# Patient Record
Sex: FEMALE | Race: WHITE | URBAN - METROPOLITAN AREA
[De-identification: names, ages, dates, MRNs, and addresses within clinical notes are randomized per-mention and may not be internally consistent; named-entity substitution may affect disease eponyms.]

---

## 2018-02-26 ENCOUNTER — APPOINTMENT (OUTPATIENT)
Dept: GENERAL RADIOLOGY | Facility: CLINIC | Age: 38
End: 2018-02-26
Attending: EMERGENCY MEDICINE
Payer: COMMERCIAL

## 2018-02-26 ENCOUNTER — APPOINTMENT (OUTPATIENT)
Dept: ULTRASOUND IMAGING | Facility: CLINIC | Age: 38
End: 2018-02-26
Attending: EMERGENCY MEDICINE
Payer: COMMERCIAL

## 2018-02-26 ENCOUNTER — APPOINTMENT (OUTPATIENT)
Dept: CT IMAGING | Facility: CLINIC | Age: 38
End: 2018-02-26
Attending: EMERGENCY MEDICINE
Payer: COMMERCIAL

## 2018-02-26 ENCOUNTER — HOSPITAL ENCOUNTER (EMERGENCY)
Facility: CLINIC | Age: 38
Discharge: HOME | End: 2018-02-26
Attending: EMERGENCY MEDICINE | Admitting: EMERGENCY MEDICINE
Payer: COMMERCIAL

## 2018-02-26 VITALS
OXYGEN SATURATION: 95 % | RESPIRATION RATE: 16 BRPM | DIASTOLIC BLOOD PRESSURE: 77 MMHG | SYSTOLIC BLOOD PRESSURE: 108 MMHG | TEMPERATURE: 95.7 F | HEART RATE: 65 BPM

## 2018-02-26 DIAGNOSIS — T78.40XA ALLERGIC REACTION, INITIAL ENCOUNTER: ICD-10-CM

## 2018-02-26 LAB
ANION GAP SERPL CALCULATED.3IONS-SCNC: 6 MMOL/L (ref 3–14)
APTT PPP: 29 SEC (ref 22–37)
BASOPHILS # BLD AUTO: 0 10E9/L (ref 0–0.2)
BASOPHILS NFR BLD AUTO: 0.7 %
BUN SERPL-MCNC: 13 MG/DL (ref 7–30)
CALCIUM SERPL-MCNC: 8.6 MG/DL (ref 8.5–10.1)
CHLORIDE SERPL-SCNC: 112 MMOL/L (ref 94–109)
CO2 SERPL-SCNC: 25 MMOL/L (ref 20–32)
CREAT SERPL-MCNC: 0.7 MG/DL (ref 0.52–1.04)
CRP SERPL-MCNC: <2.9 MG/L (ref 0–8)
D DIMER PPP FEU-MCNC: 0.3 UG/ML FEU (ref 0–0.5)
DIFFERENTIAL METHOD BLD: NORMAL
EOSINOPHIL # BLD AUTO: 0.2 10E9/L (ref 0–0.7)
EOSINOPHIL NFR BLD AUTO: 3 %
ERYTHROCYTE [DISTWIDTH] IN BLOOD BY AUTOMATED COUNT: 12.6 % (ref 10–15)
ERYTHROCYTE [SEDIMENTATION RATE] IN BLOOD BY WESTERGREN METHOD: 9 MM/H (ref 0–20)
GFR SERPL CREATININE-BSD FRML MDRD: >90 ML/MIN/1.7M2
GLUCOSE SERPL-MCNC: 98 MG/DL (ref 70–99)
HCG UR QL: NEGATIVE
HCT VFR BLD AUTO: 39.6 % (ref 35–47)
HGB BLD-MCNC: 12.8 G/DL (ref 11.7–15.7)
IMM GRANULOCYTES # BLD: 0 10E9/L (ref 0–0.4)
IMM GRANULOCYTES NFR BLD: 0.2 %
INR PPP: 1.04 (ref 0.86–1.14)
INTERNAL QC OK POCT: YES
LYMPHOCYTES # BLD AUTO: 2.1 10E9/L (ref 0.8–5.3)
LYMPHOCYTES NFR BLD AUTO: 36.8 %
MCH RBC QN AUTO: 29.8 PG (ref 26.5–33)
MCHC RBC AUTO-ENTMCNC: 32.3 G/DL (ref 31.5–36.5)
MCV RBC AUTO: 92 FL (ref 78–100)
MONOCYTES # BLD AUTO: 0.4 10E9/L (ref 0–1.3)
MONOCYTES NFR BLD AUTO: 6.4 %
NEUTROPHILS # BLD AUTO: 3 10E9/L (ref 1.6–8.3)
NEUTROPHILS NFR BLD AUTO: 52.9 %
NRBC # BLD AUTO: 0 10*3/UL
NRBC BLD AUTO-RTO: 0 /100
NT-PROBNP SERPL-MCNC: 33 PG/ML (ref 0–450)
PLATELET # BLD AUTO: 268 10E9/L (ref 150–450)
POTASSIUM SERPL-SCNC: 4.3 MMOL/L (ref 3.4–5.3)
RADIOLOGIST FLAGS: ABNORMAL
RBC # BLD AUTO: 4.29 10E12/L (ref 3.8–5.2)
SODIUM SERPL-SCNC: 143 MMOL/L (ref 133–144)
TROPONIN I SERPL-MCNC: <0.015 UG/L (ref 0–0.04)
WBC # BLD AUTO: 5.7 10E9/L (ref 4–11)

## 2018-02-26 PROCEDURE — 93970 EXTREMITY STUDY: CPT

## 2018-02-26 PROCEDURE — 86140 C-REACTIVE PROTEIN: CPT | Performed by: EMERGENCY MEDICINE

## 2018-02-26 PROCEDURE — 85730 THROMBOPLASTIN TIME PARTIAL: CPT | Performed by: EMERGENCY MEDICINE

## 2018-02-26 PROCEDURE — 84484 ASSAY OF TROPONIN QUANT: CPT | Performed by: EMERGENCY MEDICINE

## 2018-02-26 PROCEDURE — 85610 PROTHROMBIN TIME: CPT | Performed by: EMERGENCY MEDICINE

## 2018-02-26 PROCEDURE — 93010 ELECTROCARDIOGRAM REPORT: CPT | Mod: Z6 | Performed by: EMERGENCY MEDICINE

## 2018-02-26 PROCEDURE — 81025 URINE PREGNANCY TEST: CPT | Performed by: EMERGENCY MEDICINE

## 2018-02-26 PROCEDURE — 80048 BASIC METABOLIC PNL TOTAL CA: CPT | Performed by: EMERGENCY MEDICINE

## 2018-02-26 PROCEDURE — 93005 ELECTROCARDIOGRAM TRACING: CPT | Performed by: EMERGENCY MEDICINE

## 2018-02-26 PROCEDURE — 71260 CT THORAX DX C+: CPT

## 2018-02-26 PROCEDURE — 83880 ASSAY OF NATRIURETIC PEPTIDE: CPT | Performed by: EMERGENCY MEDICINE

## 2018-02-26 PROCEDURE — 25000125 ZZHC RX 250: Performed by: EMERGENCY MEDICINE

## 2018-02-26 PROCEDURE — 94640 AIRWAY INHALATION TREATMENT: CPT | Performed by: EMERGENCY MEDICINE

## 2018-02-26 PROCEDURE — 85652 RBC SED RATE AUTOMATED: CPT | Performed by: EMERGENCY MEDICINE

## 2018-02-26 PROCEDURE — 99285 EMERGENCY DEPT VISIT HI MDM: CPT | Mod: 25 | Performed by: EMERGENCY MEDICINE

## 2018-02-26 PROCEDURE — 25000128 H RX IP 250 OP 636: Performed by: EMERGENCY MEDICINE

## 2018-02-26 PROCEDURE — 85025 COMPLETE CBC W/AUTO DIFF WBC: CPT | Performed by: EMERGENCY MEDICINE

## 2018-02-26 PROCEDURE — 85379 FIBRIN DEGRADATION QUANT: CPT | Performed by: EMERGENCY MEDICINE

## 2018-02-26 PROCEDURE — 71046 X-RAY EXAM CHEST 2 VIEWS: CPT

## 2018-02-26 RX ORDER — PREDNISONE 10 MG/1
TABLET ORAL
Qty: 22 TABLET | Refills: 0 | Status: SHIPPED | OUTPATIENT
Start: 2018-02-26

## 2018-02-26 RX ORDER — IOPAMIDOL 755 MG/ML
100 INJECTION, SOLUTION INTRAVASCULAR ONCE
Status: COMPLETED | OUTPATIENT
Start: 2018-02-26 | End: 2018-02-26

## 2018-02-26 RX ORDER — PREDNISONE 20 MG/1
40 TABLET ORAL ONCE
Status: COMPLETED | OUTPATIENT
Start: 2018-02-26 | End: 2018-02-26

## 2018-02-26 RX ORDER — LEVALBUTEROL INHALATION SOLUTION 1.25 MG/3ML
1.25 SOLUTION RESPIRATORY (INHALATION) ONCE
Status: COMPLETED | OUTPATIENT
Start: 2018-02-26 | End: 2018-02-26

## 2018-02-26 RX ORDER — ALBUTEROL SULFATE 90 UG/1
2 AEROSOL, METERED RESPIRATORY (INHALATION) EVERY 6 HOURS PRN
Qty: 1 INHALER | Refills: 0 | Status: SHIPPED | OUTPATIENT
Start: 2018-02-26 | End: 2018-03-05

## 2018-02-26 RX ADMIN — IOPAMIDOL 50 ML: 755 INJECTION, SOLUTION INTRAVENOUS at 16:15

## 2018-02-26 RX ADMIN — LEVALBUTEROL HYDROCHLORIDE 1.25 MG: 1.25 SOLUTION RESPIRATORY (INHALATION) at 12:56

## 2018-02-26 RX ADMIN — SODIUM CHLORIDE 50 ML: 9 INJECTION, SOLUTION INTRAVENOUS at 16:16

## 2018-02-26 RX ADMIN — PREDNISONE 40 MG: 20 TABLET ORAL at 15:43

## 2018-02-26 ASSESSMENT — ENCOUNTER SYMPTOMS
RHINORRHEA: 1
GASTROINTESTINAL NEGATIVE: 1
WHEEZING: 1
FATIGUE: 1
COUGH: 1
FEVER: 0
CHEST TIGHTNESS: 1
SHORTNESS OF BREATH: 1
APPETITE CHANGE: 0
CHILLS: 0

## 2018-02-26 NOTE — ED AVS SNAPSHOT
[facility], Emergency Department    [address]    [place] [address]    Phone:  [phone]    Fax:  [phone]                                       Letty Simon   MRN: [id number]    Department:  [facility], Emergency Department   Date of Visit:  2/26/2018           Patient Information     Date Of Birth          1980        Your diagnoses for this visit were:     Allergic reaction, initial encounter with bronchospasm       You were seen by Sandra Cruz MD.        Discharge Instructions       Stop taking hormone birth control.     Take prednisone for 7 days.  Begin your at home dose tomorrow morning.     Use the albuterol inhaler every 6 hours until feeling better.      Please return if worsening/concerns.  (increasing shortness of breath, calf pain, leg swelling.      Please follow up with your primary care doctor this week for recheck.     A pulmonary embolism was seen on CT that appears to be an incidental finding today.  We discussed starting blood thinner shots today, but this was declined after discussion.    Please make an appointment to follow up with [facility] (phone: [phone]) within 1 week.  Call them tomorrow morning and schedule.     Today's results:  Results for orders placed or performed during the hospital encounter of 02/26/18 (from the past 24 hour(s))   CBC with platelets differential   Result Value Ref Range    WBC 5.7 4.0 - 11.0 10e9/L    RBC Count 4.29 3.8 - 5.2 10e12/L    Hemoglobin 12.8 11.7 - 15.7 g/dL    Hematocrit 39.6 35.0 - 47.0 %    MCV 92 78 - 100 fl    MCH 29.8 26.5 - 33.0 pg    MCHC 32.3 31.5 - 36.5 g/dL    RDW 12.6 10.0 - 15.0 %    Platelet Count 268 150 - 450 10e9/L    Diff Method Automated Method     % Neutrophils 52.9 %    % Lymphocytes 36.8 %    % Monocytes 6.4 %    % Eosinophils 3.0 %    % Basophils 0.7 %    % Immature Granulocytes 0.2 %    Nucleated RBCs 0 0 /100    Absolute Neutrophil 3.0 1.6 - 8.3 10e9/L    Absolute Lymphocytes 2.1 0.8  - 5.3 10e9/L    Absolute Monocytes 0.4 0.0 - 1.3 10e9/L    Absolute Eosinophils 0.2 0.0 - 0.7 10e9/L    Absolute Basophils 0.0 0.0 - 0.2 10e9/L    Abs Immature Granulocytes 0.0 0 - 0.4 10e9/L    Absolute Nucleated RBC 0.0    D dimer quantitative   Result Value Ref Range    D Dimer 0.3 0.0 - 0.50 ug/ml FEU   Basic metabolic panel   Result Value Ref Range    Sodium 143 133 - 144 mmol/L    Potassium 4.3 3.4 - 5.3 mmol/L    Chloride 112 (H) 94 - 109 mmol/L    Carbon Dioxide 25 20 - 32 mmol/L    Anion Gap 6 3 - 14 mmol/L    Glucose 98 70 - 99 mg/dL    Urea Nitrogen 13 7 - 30 mg/dL    Creatinine 0.70 0.52 - 1.04 mg/dL    GFR Estimate >90 >60 mL/min/1.7m2    GFR Estimate If Black >90 >60 mL/min/1.7m2    Calcium 8.6 8.5 - 10.1 mg/dL   Troponin I   Result Value Ref Range    Troponin I ES <0.015 0.000 - 0.045 ug/L   Nt probnp inpatient (BNP)   Result Value Ref Range    N-Terminal Pro BNP Inpatient 33 0 - 450 pg/mL   INR   Result Value Ref Range    INR 1.04 0.86 - 1.14   Partial thromboplastin time   Result Value Ref Range    PTT 29 22 - 37 sec   CRP inflammation   Result Value Ref Range    CRP Inflammation <2.9 0.0 - 8.0 mg/L   Erythrocyte sedimentation rate auto   Result Value Ref Range    Sed Rate 9 0 - 20 mm/h   hCG qual urine POCT   Result Value Ref Range    HCG Qual Urine Negative neg    Internal QC OK Yes    XR Chest 2 Views    Narrative    CHEST TWO VIEWS February 26, 2018 1:55 PM    HISTORY: Shortness of breath.    COMPARISON: None.      Impression    IMPRESSION: Hyperinflation. Otherwise negative.     TEZ WOODS MD   CT Chest Pulmonary Embolism w Contrast   Result Value Ref Range    Radiologist flags Pulmonary embolism (AA)     Narrative    CT CHEST AND PULMONARY EMBOLISM ANGIOGRAM  2/26/2018 4:17 PM     HISTORY: Dyspnea.     COMPARISON: None.    TECHNIQUE: Volumetric helical acquisition of CT images of the chest  from the lung apices to the kidneys were acquired after the  administration of Isovue 370, 50 mL  IV  contrast. Radiation dose for  this scan was reduced using automated exposure control, adjustment of  the mA and/or kV according to patient size, or iterative  reconstruction technique.    FINDINGS: There is a single perihilar right pulmonary embolism. This  is best seen on coronal image 47. Given its somewhat eccentric  position, this could be chronic, but an acute thrombus is not  excluded. Mucous plugging is seen in both lower lobes, overall this is  mild. Question some mild bronchial wall thickening as well. The heart  is not enlarged. Thoracic aorta is atherosclerotic without evidence of  dissection or aneurysm. There is no pleural or pericardial effusion.   There is no pneumothorax. Adrenal glands are normal.  Remainder of the  visualized upper abdomen is unremarkable.      Impression    IMPRESSION:   1. A single perihilar thrombus is seen in the lateral aspect of a  right lower lobe pulmonary artery. Its peripheral position within the  pulmonary artery could indicate that it is chronic, but an acute  thrombus cannot be excluded.  2. No thoracic aortic dissection or aneurysm.   3. Mild bronchial mucous plugging in both lower lobes.    [Critical Result: Pulmonary embolism]    Finding was identified on 2/26/2018 4:22 PM.      _______ was contacted by me at 2/26/2018 4:30 PM and verbalized  understanding of the critical finding.    US Lower Extremity Venous Duplex Bilateral    Narrative    VENOUS ULTRASOUND 2/26/2018  5:13 PM     HISTORY: Possible pulmonary embolism on CT, evaluate for deep vein  thrombosis/source. Shortness of breath.     COMPARISON: None.    FINDINGS:  Examination of the deep veins with graded compression and  color flow Doppler with spectral wave form analysis shows no evidence  of thrombus in the common femoral vein, femoral vein, popliteal vein  or calf veins.  There is no venous insufficiency.      Impression    IMPRESSION: No evidence of deep venous thrombosis.    VALERIO GALICIA MD          24 Hour Appointment Hotline       To make an appointment at any [facility], call [phone] ([phone]). If you don't have a family doctor or clinic, we will help you find one. [facility] clinics are conveniently located to serve the needs of you and your family.             Review of your medicines      Notice     You have not been prescribed any medications.            Procedures and tests performed during your visit     Basic metabolic panel    CBC with platelets differential    CRP inflammation    CT Chest Pulmonary Embolism w Contrast    D dimer quantitative    EKG 12 lead    Erythrocyte sedimentation rate auto    INR    Nt probnp inpatient (BNP)    Partial thromboplastin time    Troponin I    US Lower Extremity Venous Duplex Bilateral    XR Chest 2 Views    hCG qual urine POCT      Orders Needing Specimen Collection     None      Pending Results     Date and Time Order Name Status Description    2/26/2018 1434 CT Chest Pulmonary Embolism w Contrast Preliminary             Pending Culture Results     No orders found from 2/24/2018 to 2/27/2018.            Pending Results Instructions     If you had any lab results that were not finalized at the time of your Discharge, you can call the ED Lab Result RN at [phone]. You will be contacted by this team for any positive Lab results or changes in treatment. The nurses are available 7 days a week from 10A to 6:30P.  You can leave a message 24 hours per day and they will return your call.        Thank you for choosing [facility]       Thank you for choosing [facility] for your care. Our goal is always to provide you with excellent care. Hearing back from our patients is one way we can continue to improve our services. Please take a few minutes to complete the written survey that you may receive in the mail after you visit with us. Thank you!        Medical Referral Sourcehart Information     "[employer] lets you send messages to your doctor, view your test results, renew  "your prescriptions, schedule appointments and more. To sign up, go to [email or web address] . Click on \"Log in\" on the left side of the screen, which will take you to the Welcome page. Then click on \"Sign up Now\" on the right side of the page.     You will be asked to enter the access code listed below, as well as some personal information. Please follow the directions to create your username and password.     Your access code is: [email or web address]  Expires: 2018  6:59 PM     Your access code will  in 90 days. If you need help or a new code, please call your [facility] clinic or [phone].        Care EveryWhere ID     This is your Care EveryWhere ID. This could be used by other organizations to access your [facility] medical records  [id number]        Equal Access to Services     AMPARO FRANCE : Angela Anne, darlene saeed, chung solorzano, shari rosas . So [facility] [phone].    ATENCIÓN: Si habla español, tiene a fletcher disposición servicios gratuitos de asistencia lingüística. Llame al [phone].    We comply with applicable federal civil rights laws and [place] laws. We do not discriminate on the basis of race, color, national origin, age, disability, sex, sexual orientation, or gender identity.            After Visit Summary       This is your record. Keep this with you and show to your community pharmacist(s) and doctor(s) at your next visit.                  "

## 2018-02-26 NOTE — ED NOTES
"Pr reported after being around dogs 4 days ago, \" I start sneezing, watery eye, no cough, skin irritation, than I start feeling SOB and wheezing\"  "

## 2018-02-26 NOTE — ED AVS SNAPSHOT
[facility], Emergency Department    [address]    [facility] [address]    Phone:  [phone]    Fax:  [phone]                                       Letty Simon   MRN: [id number]    Department:  [facility], Emergency Department   Date of Visit:  2/26/2018           After Visit Summary Signature Page     I have received my discharge instructions, and my questions have been answered. I have discussed any challenges I see with this plan with the nurse or doctor.    ..........................................................................................................................................  Patient/Patient Representative Signature      ..........................................................................................................................................  Patient Representative Print Name and Relationship to Patient    ..................................................               ................................................  Date                                            Time    ..........................................................................................................................................  Reviewed by Signature/Title    ...................................................              ..............................................  Date                                                            Time

## 2018-02-26 NOTE — ED PROVIDER NOTES
History     Chief Complaint   Patient presents with     Shortness of Breath     started 3-4 days ago, pt reported wheezing, chest tideness, no hx of asthma.     HPI  Letty Simon is a 37 year old female who presents to the Emergency Department for evaluation of shortness of breath.  Patient reports that she had an allergic reaction to a dog 4 days ago including skin rash, sneezing, watery eyes.  She has never had problem with dogs in the past.  For the past 2 days she has had increased shortness of breath and fatigue with daily activities.  This is better when she is sitting and worse when she is lying down or with movement.  She has also had chest tightness and wheezing.  She has no history of asthma.  Patient is typically physically active and exercises regularly.  She also has some nasal congestion and a runny nose.  Patient denies pregnancy.  She is taking oral birth control.  She has had no fevers.      She takes ocp and has done so on and off for 10 years.  She has been on ocp continually for the past 3 years.     History reviewed. No pertinent past medical history.    Past Surgical History:   Procedure Laterality Date     ENT SURGERY         No family history on file.    Social History   Substance Use Topics     Smoking status: Never Smoker     Smokeless tobacco: Never Used     Alcohol use 3.0 oz/week     5 Glasses of wine per week       No current facility-administered medications for this encounter.      No current outpatient prescriptions on file.      Not on File    I have reviewed the Medications, Allergies, Past Medical and Surgical History, and Social History in the Epic system.    Review of Systems   Constitutional: Positive for fatigue. Negative for appetite change, chills and fever.   HENT: Positive for congestion and rhinorrhea.    Respiratory: Positive for cough (occasional ), chest tightness, shortness of breath and wheezing.    Cardiovascular: Negative for leg swelling.   Gastrointestinal:  Negative.    All other systems reviewed and are negative.      Physical Exam   BP: 130/81  Pulse: 121  Heart Rate: 60  Temp: 95.9  F (35.5  C) (pt reported drinking water couple of minnutes ago)  Resp: 18  SpO2: 94 %      Physical Exam   Constitutional: She is oriented to person, place, and time. She appears well-developed and well-nourished. No distress.   Appears sob with talking.  Thin female    HENT:   Head: Normocephalic and atraumatic.   Right Ear: External ear normal.   Left Ear: External ear normal.   Nose: Nose normal.   Mouth/Throat: Oropharynx is clear and moist.   Eyes: EOM are normal. Pupils are equal, round, and reactive to light.   Neck: Normal range of motion. Neck supple.   Cardiovascular: Normal rate, regular rhythm, normal heart sounds and intact distal pulses.    Pulmonary/Chest: Effort normal and breath sounds normal. Wheezes: ? mild wheezing.  good air movement.    Abdominal: Soft. Bowel sounds are normal.   Musculoskeletal: Normal range of motion. She exhibits no edema or tenderness.   Neurological: She is alert and oriented to person, place, and time.   Skin: Skin is warm and dry. She is not diaphoretic.   Psychiatric: She has a normal mood and affect. Her behavior is normal. Judgment and thought content normal.   Nursing note and vitals reviewed.      ED Course   12:17 PM  The patient was seen and examined by Sandra Cruz MD in Room 18.     ED Course     Procedures             EKG Interpretation:      Interpreted by Sandra Cruz  Time reviewed: 1148  Symptoms at time of EKG: shortness of breath  Rhythm: normal sinus   Rate: normal  Axis: normal  Ectopy: none  Conduction: normal  ST Segments/ T Waves: No ST-T wave changes  Q Waves: none  Comparison to prior: No old EKG available    Clinical Impression: normal EKG          Criti     Labs Ordered and Resulted from Time of ED Arrival Up to the Time of Departure from the ED   BASIC METABOLIC PANEL - Abnormal; Notable for the following:        Result  Value    Chloride 112 (*)     All other components within normal limits   HCG QUAL URINE POCT - Normal   CBC WITH PLATELETS DIFFERENTIAL   D DIMER QUANTITATIVE   TROPONIN I   NT PROBNP INPATIENT   INR   PARTIAL THROMBOPLASTIN TIME   CRP INFLAMMATION   ERYTHROCYTE SEDIMENTATION RATE AUTO     Results for orders placed or performed during the hospital encounter of 02/26/18 (from the past 24 hour(s))   CBC with platelets differential   Result Value Ref Range    WBC 5.7 4.0 - 11.0 10e9/L    RBC Count 4.29 3.8 - 5.2 10e12/L    Hemoglobin 12.8 11.7 - 15.7 g/dL    Hematocrit 39.6 35.0 - 47.0 %    MCV 92 78 - 100 fl    MCH 29.8 26.5 - 33.0 pg    MCHC 32.3 31.5 - 36.5 g/dL    RDW 12.6 10.0 - 15.0 %    Platelet Count 268 150 - 450 10e9/L    Diff Method Automated Method     % Neutrophils 52.9 %    % Lymphocytes 36.8 %    % Monocytes 6.4 %    % Eosinophils 3.0 %    % Basophils 0.7 %    % Immature Granulocytes 0.2 %    Nucleated RBCs 0 0 /100    Absolute Neutrophil 3.0 1.6 - 8.3 10e9/L    Absolute Lymphocytes 2.1 0.8 - 5.3 10e9/L    Absolute Monocytes 0.4 0.0 - 1.3 10e9/L    Absolute Eosinophils 0.2 0.0 - 0.7 10e9/L    Absolute Basophils 0.0 0.0 - 0.2 10e9/L    Abs Immature Granulocytes 0.0 0 - 0.4 10e9/L    Absolute Nucleated RBC 0.0    D dimer quantitative   Result Value Ref Range    D Dimer 0.3 0.0 - 0.50 ug/ml FEU   Basic metabolic panel   Result Value Ref Range    Sodium 143 133 - 144 mmol/L    Potassium 4.3 3.4 - 5.3 mmol/L    Chloride 112 (H) 94 - 109 mmol/L    Carbon Dioxide 25 20 - 32 mmol/L    Anion Gap 6 3 - 14 mmol/L    Glucose 98 70 - 99 mg/dL    Urea Nitrogen 13 7 - 30 mg/dL    Creatinine 0.70 0.52 - 1.04 mg/dL    GFR Estimate >90 >60 mL/min/1.7m2    GFR Estimate If Black >90 >60 mL/min/1.7m2    Calcium 8.6 8.5 - 10.1 mg/dL   Troponin I   Result Value Ref Range    Troponin I ES <0.015 0.000 - 0.045 ug/L   Nt probnp inpatient (BNP)   Result Value Ref Range    N-Terminal Pro BNP Inpatient 33 0 - 450 pg/mL   INR    Result Value Ref Range    INR 1.04 0.86 - 1.14   Partial thromboplastin time   Result Value Ref Range    PTT 29 22 - 37 sec   CRP inflammation   Result Value Ref Range    CRP Inflammation <2.9 0.0 - 8.0 mg/L   Erythrocyte sedimentation rate auto   Result Value Ref Range    Sed Rate 9 0 - 20 mm/h   hCG qual urine POCT   Result Value Ref Range    HCG Qual Urine Negative neg    Internal QC OK Yes    XR Chest 2 Views    Narrative    CHEST TWO VIEWS February 26, 2018 1:55 PM    HISTORY: Shortness of breath.    COMPARISON: None.      Impression    IMPRESSION: Hyperinflation. Otherwise negative.     TEZ WOODS MD   CT Chest Pulmonary Embolism w Contrast   Result Value Ref Range    Radiologist flags Pulmonary embolism (AA)     Narrative    CT CHEST AND PULMONARY EMBOLISM ANGIOGRAM  2/26/2018 4:17 PM     HISTORY: Dyspnea.     COMPARISON: None.    TECHNIQUE: Volumetric helical acquisition of CT images of the chest  from the lung apices to the kidneys were acquired after the  administration of Isovue 370, 50 mL  IV contrast. Radiation dose for  this scan was reduced using automated exposure control, adjustment of  the mA and/or kV according to patient size, or iterative  reconstruction technique.    FINDINGS: There is a single perihilar right pulmonary embolism. This  is best seen on coronal image 47. Given its somewhat eccentric  position, this could be chronic, but an acute thrombus is not  excluded. Mucous plugging is seen in both lower lobes, overall this is  mild. Question some mild bronchial wall thickening as well. The heart  is not enlarged. Thoracic aorta is atherosclerotic without evidence of  dissection or aneurysm. There is no pleural or pericardial effusion.   There is no pneumothorax. Adrenal glands are normal.  Remainder of the  visualized upper abdomen is unremarkable.      Impression    IMPRESSION:   1. A single perihilar thrombus is seen in the lateral aspect of a  right lower lobe pulmonary artery. Its  peripheral position within the  pulmonary artery could indicate that it is chronic, but an acute  thrombus cannot be excluded.  2. No thoracic aortic dissection or aneurysm.   3. Mild bronchial mucous plugging in both lower lobes.    [Critical Result: Pulmonary embolism]    Finding was identified on 2/26/2018 4:22 PM.      _______ was contacted by me at 2/26/2018 4:30 PM and verbalized  understanding of the critical finding.    US Lower Extremity Venous Duplex Bilateral    Narrative    VENOUS ULTRASOUND 2/26/2018  5:13 PM     HISTORY: Possible pulmonary embolism on CT, evaluate for deep vein  thrombosis/source. Shortness of breath.     COMPARISON: None.    FINDINGS:  Examination of the deep veins with graded compression and  color flow Doppler with spectral wave form analysis shows no evidence  of thrombus in the common femoral vein, femoral vein, popliteal vein  or calf veins.  There is no venous insufficiency.      Impression    IMPRESSION: No evidence of deep venous thrombosis.    VALERIO GALICIA MD            Assessments & Plan (with Medical Decision Making)   The patient presents with allergic type symptoms after exposure to a dog and has had sob since.  She feels sob with exertion and also laying flat.  She has questionable mild wheezing on exam.  No hx of asthma. Her sats are 94% on room.  She is not in distress.  Diff dx included allergic reaction with bronchospasm, pneumonia, pneumothorax, pe, pericarditis.  Labs were done.  Inflammatory markers and troponin are negative maing pericarditis less likely.  ECG is also normal.  bnp negative.  D dimer is negative.  She was tachycardic at 121 upon arrival.  She was given oral prednisone and xopenex.  She did feel better.  However she was still somewhat sob. CXR was done and was read as hyperinflation.   She is on ocp.  Given her presentation and ocp use, and because she has never had this kind of reaction to a dog before, I chose to do a Chest CT to evaluation  for PE.  The CT came back as IMPRESSION:   1. A single perihilar thrombus is seen in the lateral aspect of a  right lower lobe pulmonary artery. Its peripheral position within the  pulmonary artery could indicate that it is chronic, but an acute  thrombus cannot be excluded.  2. No thoracic aortic dissection or aneurysm.   3. Mild bronchial mucous plugging in both lower lobes.    I then did bilateral lower leg ultrasounds which showed no evidence of dvt.  I discussed the case with the [facility] fellow who felt the thrombus was an incidental finding.  The pe appeared chronic and not acute.  Her symptoms due seem more allergic.  She was feeling much better by now (after prednisone and neb ).  I reviewed PE risk factors with the patient and OCP seems her only risk.  She has been on ocp continually for the past 3 years.  No prior pe/dvt.  We discussed stopping the ocp.  The [facility] fellow states she could just stop ocp and f/u in their clinic, or could take lovenox for now and f/u in their clinic.  He felt either option was correct.  I discussed this with the patient and she would like to not take lovenox.  She prefers to f/u in [facility] clinic first to discuss her option/treatment/testing. [facility] took her info and will arrange f/u with them within 1 week.  She will return if any worsening/concerns.   I will d/c her home with prednisone and albuterol for what appears to be an allergic reaction.  She will f/u with her pmd/[facility] for this.       I have reviewed the nursing notes.    I have reviewed the findings, diagnosis, plan and need for follow up with the patient.    New Prescriptions    No medications on file       Final diagnoses:   Allergic reaction, initial encounter - with bronchospasm   I, Jocelyn Hi, am serving as a trained medical scribe to document services personally performed by Sandra Cruz MD, based on the provider's statements to me.      I, Sandra Cruz MD, was physically present and have reviewed and verified  the accuracy of this note documented by Jocelyn Hi.       2/26/2018   [facility], [place], EMERGENCY DEPARTMENT     Sandra Cruz MD  03/18/18 0499

## 2018-02-27 LAB — INTERPRETATION ECG - MUSE: NORMAL

## 2018-02-27 NOTE — DISCHARGE INSTRUCTIONS
Stop taking hormone birth control.     Take prednisone for 7 days.  Begin your at home dose tomorrow morning.     Use the albuterol inhaler every 6 hours until feeling better.      Please return if worsening/concerns.  (increasing shortness of breath, calf pain, leg swelling.      Please follow up with your primary care doctor this week for recheck.     A pulmonary embolism was seen on CT that appears to be an incidental finding today.  We discussed starting blood thinner shots today, but this was declined after discussion.    Please make an appointment to follow up with [facility] (phone: [phone]) within 1 week.  Call them tomorrow morning and schedule.     Today's results:  Results for orders placed or performed during the hospital encounter of 02/26/18 (from the past 24 hour(s))   CBC with platelets differential   Result Value Ref Range    WBC 5.7 4.0 - 11.0 10e9/L    RBC Count 4.29 3.8 - 5.2 10e12/L    Hemoglobin 12.8 11.7 - 15.7 g/dL    Hematocrit 39.6 35.0 - 47.0 %    MCV 92 78 - 100 fl    MCH 29.8 26.5 - 33.0 pg    MCHC 32.3 31.5 - 36.5 g/dL    RDW 12.6 10.0 - 15.0 %    Platelet Count 268 150 - 450 10e9/L    Diff Method Automated Method     % Neutrophils 52.9 %    % Lymphocytes 36.8 %    % Monocytes 6.4 %    % Eosinophils 3.0 %    % Basophils 0.7 %    % Immature Granulocytes 0.2 %    Nucleated RBCs 0 0 /100    Absolute Neutrophil 3.0 1.6 - 8.3 10e9/L    Absolute Lymphocytes 2.1 0.8 - 5.3 10e9/L    Absolute Monocytes 0.4 0.0 - 1.3 10e9/L    Absolute Eosinophils 0.2 0.0 - 0.7 10e9/L    Absolute Basophils 0.0 0.0 - 0.2 10e9/L    Abs Immature Granulocytes 0.0 0 - 0.4 10e9/L    Absolute Nucleated RBC 0.0    D dimer quantitative   Result Value Ref Range    D Dimer 0.3 0.0 - 0.50 ug/ml FEU   Basic metabolic panel   Result Value Ref Range    Sodium 143 133 - 144 mmol/L    Potassium 4.3 3.4 - 5.3 mmol/L    Chloride 112 (H) 94 - 109 mmol/L    Carbon Dioxide 25 20 - 32 mmol/L    Anion Gap 6 3 - 14 mmol/L     Glucose 98 70 - 99 mg/dL    Urea Nitrogen 13 7 - 30 mg/dL    Creatinine 0.70 0.52 - 1.04 mg/dL    GFR Estimate >90 >60 mL/min/1.7m2    GFR Estimate If Black >90 >60 mL/min/1.7m2    Calcium 8.6 8.5 - 10.1 mg/dL   Troponin I   Result Value Ref Range    Troponin I ES <0.015 0.000 - 0.045 ug/L   Nt probnp inpatient (BNP)   Result Value Ref Range    N-Terminal Pro BNP Inpatient 33 0 - 450 pg/mL   INR   Result Value Ref Range    INR 1.04 0.86 - 1.14   Partial thromboplastin time   Result Value Ref Range    PTT 29 22 - 37 sec   CRP inflammation   Result Value Ref Range    CRP Inflammation <2.9 0.0 - 8.0 mg/L   Erythrocyte sedimentation rate auto   Result Value Ref Range    Sed Rate 9 0 - 20 mm/h   hCG qual urine POCT   Result Value Ref Range    HCG Qual Urine Negative neg    Internal QC OK Yes    XR Chest 2 Views    Narrative    CHEST TWO VIEWS February 26, 2018 1:55 PM    HISTORY: Shortness of breath.    COMPARISON: None.      Impression    IMPRESSION: Hyperinflation. Otherwise negative.     TEZ WOODS MD   CT Chest Pulmonary Embolism w Contrast   Result Value Ref Range    Radiologist flags Pulmonary embolism (AA)     Narrative    CT CHEST AND PULMONARY EMBOLISM ANGIOGRAM  2/26/2018 4:17 PM     HISTORY: Dyspnea.     COMPARISON: None.    TECHNIQUE: Volumetric helical acquisition of CT images of the chest  from the lung apices to the kidneys were acquired after the  administration of Isovue 370, 50 mL  IV contrast. Radiation dose for  this scan was reduced using automated exposure control, adjustment of  the mA and/or kV according to patient size, or iterative  reconstruction technique.    FINDINGS: There is a single perihilar right pulmonary embolism. This  is best seen on coronal image 47. Given its somewhat eccentric  position, this could be chronic, but an acute thrombus is not  excluded. Mucous plugging is seen in both lower lobes, overall this is  mild. Question some mild bronchial wall thickening as well. The  heart  is not enlarged. Thoracic aorta is atherosclerotic without evidence of  dissection or aneurysm. There is no pleural or pericardial effusion.   There is no pneumothorax. Adrenal glands are normal.  Remainder of the  visualized upper abdomen is unremarkable.      Impression    IMPRESSION:   1. A single perihilar thrombus is seen in the lateral aspect of a  right lower lobe pulmonary artery. Its peripheral position within the  pulmonary artery could indicate that it is chronic, but an acute  thrombus cannot be excluded.  2. No thoracic aortic dissection or aneurysm.   3. Mild bronchial mucous plugging in both lower lobes.    [Critical Result: Pulmonary embolism]    Finding was identified on 2/26/2018 4:22 PM.      _______ was contacted by me at 2/26/2018 4:30 PM and verbalized  understanding of the critical finding.    US Lower Extremity Venous Duplex Bilateral    Narrative    VENOUS ULTRASOUND 2/26/2018  5:13 PM     HISTORY: Possible pulmonary embolism on CT, evaluate for deep vein  thrombosis/source. Shortness of breath.     COMPARISON: None.    FINDINGS:  Examination of the deep veins with graded compression and  color flow Doppler with spectral wave form analysis shows no evidence  of thrombus in the common femoral vein, femoral vein, popliteal vein  or calf veins.  There is no venous insufficiency.      Impression    IMPRESSION: No evidence of deep venous thrombosis.    VALERIO GALICIA MD

## 2018-03-05 ENCOUNTER — OFFICE VISIT (OUTPATIENT)
Dept: HEMATOLOGY | Facility: CLINIC | Age: 38
End: 2018-03-05
Attending: PHYSICIAN ASSISTANT
Payer: COMMERCIAL

## 2018-03-05 VITALS
TEMPERATURE: 98 F | WEIGHT: 115.4 LBS | BODY MASS INDEX: 18.11 KG/M2 | OXYGEN SATURATION: 98 % | RESPIRATION RATE: 12 BRPM | DIASTOLIC BLOOD PRESSURE: 75 MMHG | HEIGHT: 67 IN | SYSTOLIC BLOOD PRESSURE: 121 MMHG | HEART RATE: 81 BPM

## 2018-03-05 DIAGNOSIS — I26.99 OTHER PULMONARY EMBOLISM WITHOUT ACUTE COR PULMONALE, UNSPECIFIED CHRONICITY (H): Primary | ICD-10-CM

## 2018-03-05 PROCEDURE — 99207 ZZC CONSULT E&M CHANGED TO SUBSEQUENT LEVEL: CPT | Performed by: PHYSICIAN ASSISTANT

## 2018-03-05 PROCEDURE — 99214 OFFICE O/P EST MOD 30 MIN: CPT | Performed by: PHYSICIAN ASSISTANT

## 2018-03-05 PROCEDURE — G0463 HOSPITAL OUTPT CLINIC VISIT: HCPCS

## 2018-03-05 RX ORDER — ALBUTEROL SULFATE 90 UG/1
2 AEROSOL, METERED RESPIRATORY (INHALATION) PRN
COMMUNITY
Start: 2018-02-26

## 2018-03-05 ASSESSMENT — PAIN SCALES - GENERAL: PAINLEVEL: NO PAIN (0)

## 2018-03-05 NOTE — MR AVS SNAPSHOT
"              After Visit Summary   3/5/2018    Letty Simon    MRN: [id number]           Patient Information     Date Of Birth          1980        Visit Information        Provider Department      3/5/2018 2:00 PM Galileo Chambers PA-C [facility]        Today's Diagnoses     Other pulmonary embolism without acute cor pulmonale, unspecified chronicity (H)    -  1       Follow-ups after your visit        Follow-up notes from your care team     Return As needed.      Who to contact     If you have questions or need follow up information about today's clinic visit or your schedule please contact [facility] directly at [phone].  Normal or non-critical lab and imaging results will be communicated to you by [employer], letter or phone within 4 business days after the clinic has received the results. If you do not hear from us within 7 days, please contact the clinic through [employer] or phone. If you have a critical or abnormal lab result, we will notify you by phone as soon as possible.  Submit refill requests through [employer] or call your pharmacy and they will forward the refill request to us. Please allow 3 business days for your refill to be completed.          Additional Information About Your Visit        MyChart Information     [employer] lets you send messages to your doctor, view your test results, renew your prescriptions, schedule appointments and more. To sign up, go to [email or web address] . Click on \"Log in\" on the left side of the screen, which will take you to the Welcome page. Then click on \"Sign up Now\" on the right side of the page.     You will be asked to enter the access code listed below, as well as some personal information. Please follow the directions to create your username and password.     Your access code is: [email or web address]  Expires: 2018  6:59 PM     Your access code will  in 90 days. If you need help or a new " "code, please call your [facility] clinic or [phone].        Care EveryWhere ID     This is your Care EveryWhere ID. This could be used by other organizations to access your [facility] medical records  [id number]        Your Vitals Were     Pulse Temperature Respirations Height Pulse Oximetry BMI (Body Mass Index)    81 98  F (36.7  C) (Oral) 12 1.689 m (5' 6.5\") 98% 18.35 kg/m2       Blood Pressure from Last 3 Encounters:   03/05/18 121/75   02/26/18 108/77    Weight from Last 3 Encounters:   03/05/18 52.3 kg (115 lb 6.4 oz)              Today, you had the following     No orders found for display       Primary Care Provider Fax #    Physician No Ref-Primary [phone]       No address on file        Equal Access to Services     [facility] : Angela Anne, wacharles saeed, chung kaalmashlomo solorzano, shari rosas . So [facility] [phone].    ATENCIÓN: Si habla español, tiene a fletchre disposición servicios gratuitos de asistencia lingüística. Trupti al [phone].    We comply with applicable federal civil rights laws and [place] laws. We do not discriminate on the basis of race, color, national origin, age, disability, sex, sexual orientation, or gender identity.            Thank you!     Thank you for choosing [facility]  for your care. Our goal is always to provide you with excellent care. Hearing back from our patients is one way we can continue to improve our services. Please take a few minutes to complete the written survey that you may receive in the mail after your visit with us. Thank you!             Your Updated Medication List - Protect others around you: Learn how to safely use, store and throw away your medicines at www.disposemymeds.org.          This list is accurate as of 3/5/18  5:56 PM.  Always use your most recent med list.                   Brand Name Dispense Instructions for use Diagnosis    predniSONE 10 MG tablet "    DELTASONE    22 tablet    Take 40 mg by mouth daily for 4 days, then 30 mg by mouth daily for 1 day, then 20 mg by mouth daily for 1 day, then 10 mg by mouth daily for 1 day.        VENTOLIN  (90 BASE) MCG/ACT Inhaler   Generic drug:  albuterol      Inhale 2 puffs into the lungs as needed Has not needed

## 2018-03-05 NOTE — PROGRESS NOTES
"    [facility]  [address] [place] [id number]  Main: [phone], Fax: [phone]    Patient seen at: [facility] at [address] [place] [address]    Outpatient Visit Note:    Patient: Letty Simon  MRN: [id number]  : 1980  RACHAEL: 2018    Reason:  Recent finding of chronic appearing small pulmonary embolism.    HPI:  This is a 37 year old female with no significant past medical history, who recently is found to have a chronic appearing small pulmonary embolism on Chest CT, self referred to our clinic for consultation in regard to this venous thromboembolic event. Letty was in her usual state of health until a few days prior to 2018 where she started to experience some allergic type reaction with itchy diffuse skin rash, sneezing and watery eyes. She is not entirely clear what she was allergic to but suspected that she might be allergic to the dogs. 2 days prior to her presenting to the emergency department, she apparently start having shortness of breath and fatigue with daily activities. Her symptoms apparently is better when she is sitting and worse when she is lying down or with movement. She also was complaining of some chest tightness and wheezing. She has NO history of asthma. On 2018, she seek medical help at the emergency department here at the [facility].     A D-Dimer test was done, which was later came back to be negative at 0.3. The ED physician subsequently performed a CT Chest which showed: \"1. A single perihilar thrombus is seen in the lateral aspect of a right lower lobe pulmonary artery. Its peripheral position within the pulmonary artery could indicate that it is chronic, but an acute thrombus cannot be excluded. 2. No thoracic aortic dissection or aneurysm.  3. Mild bronchial mucous plugging in both lower lobes.\" An ultrasound of the lower extremity was also done showed no evidence of DVT in " "either lower extremities. These reports were discussed with the o on call hematologist at the time who recommend stopping estrogen based OCP and consider starting anticoagulation therapy. However, the patient refused to start any type of anticoagulation therapy at the time and was discharged home with prednisone and albuterol inhaler. She reports that she had been on estrogen based OCP on and off for about 10 years. Then about 3 years ago, she started back on estrogen OCP on a more regular basis.     She reports today that her symptoms rapidly improved with prednisone and albuterol use. Her shortness of breath has resolved and she denies any respiratory symptoms currently.       Past Medical History:  No past medical history on file.    Past Surgical History:  Past Surgical History:   Procedure Laterality Date     ENT SURGERY         Medications:  Current Outpatient Prescriptions   Medication Sig Dispense Refill     albuterol (VENTOLIN HFA) 108 (90 BASE) MCG/ACT Inhaler Inhale 2 puffs into the lungs as needed Has not needed       predniSONE (DELTASONE) 10 MG tablet Take 40 mg by mouth daily for 4 days, then 30 mg by mouth daily for 1 day, then 20 mg by mouth daily for 1 day, then 10 mg by mouth daily for 1 day. (Patient not taking: Reported on 3/5/2018) 22 tablet 0     [DISCONTINUED] albuterol (PROAIR HFA/PROVENTIL HFA/VENTOLIN HFA) 108 (90 BASE) MCG/ACT Inhaler Inhale 2 puffs into the lungs every 6 hours as needed for shortness of breath / dyspnea or wheezing 1 Inhaler 0        Allergies:  Allergies   Allergen Reactions     Lactase GI Disturbance     \"digestion problems\"     Seasonal Allergies Itching     LW Other1: -DUST, GRASS, POLLEN, TREES     Sumac Blisters     Citrus Rash       ROS:  Denies any bleeding issues. No gum bleeding, No nose bleed. Denies any hematuria or blood in stools. Denies any ecchymosis. Denies any lower extremities swelling or pain. Denies any fever, no chest pain. Denies any shortness of " breath.    Social History:  Denies any tobacco use. No significant alcohol use. Denies any illicit drug use. She is an artist.     Family History:  She is not aware of any family members with history of DVT/PE.     Objectives:  Pleasant 37 year old female in no acute distress.  Vitals: B/P: 121/75, T: 98, P: 81, R: 12, Wt: 115 lbs 6.4 oz  Exam:   Complete exam is not performed today.    Labs:  Component      Latest Ref Rng & Units 2/26/2018   WBC      4.0 - 11.0 10e9/L 5.7   RBC Count      3.8 - 5.2 10e12/L 4.29   Hemoglobin      11.7 - 15.7 g/dL 12.8   Hematocrit      35.0 - 47.0 % 39.6   MCV      78 - 100 fl 92   MCH      26.5 - 33.0 pg 29.8   MCHC      31.5 - 36.5 g/dL 32.3   RDW      10.0 - 15.0 % 12.6   Platelet Count      150 - 450 10e9/L 268   Diff Method       Automated Method   % Neutrophils      % 52.9   % Lymphocytes      % 36.8   % Monocytes      % 6.4   % Eosinophils      % 3.0   % Basophils      % 0.7   % Immature Granulocytes      % 0.2   Nucleated RBCs      0 /100 0   Absolute Neutrophil      1.6 - 8.3 10e9/L 3.0   Absolute Lymphocytes      0.8 - 5.3 10e9/L 2.1   Absolute Monocytes      0.0 - 1.3 10e9/L 0.4   Absolute Eosinophils      0.0 - 0.7 10e9/L 0.2   Absolute Basophils      0.0 - 0.2 10e9/L 0.0   Abs Immature Granulocytes      0 - 0.4 10e9/L 0.0   Absolute Nucleated RBC       0.0   HCG Qual Urine      neg Negative   Internal QC OK       Yes   D-Dimer      0.0 - 0.50 ug/ml FEU 0.3   INR      0.86 - 1.14 1.04   PTT      22 - 37 sec 29   CRP Inflammation      0.0 - 8.0 mg/L <2.9   Sed Rate      0 - 20 mm/h 9       Imaging:  CT Chest on 2/26/2018:  1. A single perihilar thrombus is seen in the lateral aspect of a right lower lobe pulmonary artery. Its peripheral position within the pulmonary artery could indicate that it is chronic, but an acute thrombus cannot be excluded. 2. No thoracic aortic dissection or aneurysm.  3. Mild bronchial mucous plugging in both lower lobes.    US 2/26/2018  bilateral lower extremity:  No evidence of DVT.     Assessment:  In summary, Letty is a 37 year old female who has no significant past medical history recently found to have an age indeterminate small single pulmonary embolism on 2/26/2018, self referred to this clinic for consultation. Her single small pulmonary arterial thrombus found on 2/26/2018 was considered age indeterminate and it is considered an incidental finding. At the time of her presentation, her D-dimer was negative further suggest that this finding is not an acute event. This incidental finding of a single small pulmonary embolism is likely clinically not significant. Letty's symptoms resolved quickly with prednisone and albuterol despite not being on anticoagulation therapy, thus further suggested that her symptoms were not related to the pulmonary embolism and that of an allergic reaction.     Diagnosis:  1. Single small age indeterminate pulmonary embolism found on 2/26/2018. Likely chronic and likely not clinically significant.     Plan:  I have a long discussion with Letty in regard to our interpretation of her recent CT chest findings. I explain to her that this finding is likely incidental and that she did not have an acute pulmonary embolic event. Additionally, her symptoms has rapidly and appropriately responded with Prednisone and Albuterol use. Thus, there is currently no indications for starting anticoagulation therapy. We do, however, recommend that she avoid the use of any estrogen containing products in the future. I also take some time today to educate her about DVT/PE and to review signs and symptoms of a DVT/PE. She is instructed to seek immediate medical attention in the future if she should experienced any of these signs and/or symptoms. We would also recommend aggressive mechanical and/or pharmacological DVT/PE prophylaxis for this patient in the future when she is at situation where she is at risk for DVT/PE.     The patient is  given our center's contact information and is instructed to call if she should have any further questions or concerns.  Otherwise, I have no plans to see this patient back on a routine basis.    Simona Capps, nurse clinician is present throughout the entire clinic visit with the patient today.  Patient understands and agrees with the above plan and recommendation.    Total Time Spent:  38 minutes, all 38 minutes was spent on face-to-face consultation of the patient and coordination of care in regard to her recent diagnosis of right sided pulmonary embolism.       Galileo Chambers PA-C, MPAS  Physician Assistant  [facility].

## 2018-03-09 NOTE — NURSING NOTE
Medication and allergies reviewed and updated.    Teaching Flowsheet   Relevant Diagnosis: Incidentally found PE when in ED for presumed allergic/asthma attack  Teaching Topic: Basics of thrombosis, treatment options and introduction to the [facility]     Person(s) involved in teaching:   Patient     Motivation Level:  Asks Questions: Yes    Eager to Learn: Yes  Cooperative: Yes  Receptive (willing/able to accept information): Yes  Comments: very appreciative, hungry for info     Patient demonstrates understanding of the following:  Reason for the appointment, diagnosis and treatment plan: Yes  Knowledge of proper use of medications and conditions for which they are ordered (with special attention to potential side effects or drug interactions): Yes  Which situations necessitate calling provider and whom to contact: Yes      Proper use and care of   (medical equip, care aids, etc.): NA  Nutritional needs and diet plan: NA  Pain management techniques: NA  Wound Care: NA  How and/when to access community resources: Yes    Educated patient about the signs, symptoms of and risk factors for venous thrombosis (VTE) and provided an educational  book amisha, which reviews these facts. And includes the following web links  for further information:  www.stoptheclot.org, www.clotconnect.org.    Discussed the differences between arterial and venous thrombosis with the patient and the divergent medications used for each.    Patient verbalized understanding of the above information.  Patient given the contact card for the [facility] and has the appropriate numbers to call with any questions.      Simona Capps RN - Nurse Clinician - [facility] - [phone]